# Patient Record
Sex: FEMALE | Race: WHITE | NOT HISPANIC OR LATINO | Employment: OTHER | URBAN - NONMETROPOLITAN AREA
[De-identification: names, ages, dates, MRNs, and addresses within clinical notes are randomized per-mention and may not be internally consistent; named-entity substitution may affect disease eponyms.]

---

## 2024-01-04 DIAGNOSIS — E78.5 HYPERLIPIDEMIA, UNSPECIFIED HYPERLIPIDEMIA TYPE: Primary | ICD-10-CM

## 2024-01-04 PROBLEM — C50.919 BREAST CANCER (MULTI): Status: ACTIVE | Noted: 2024-01-04

## 2024-01-04 PROBLEM — E66.3 OVERWEIGHT: Status: ACTIVE | Noted: 2024-01-04

## 2024-01-04 PROBLEM — E06.3 HASHIMOTO'S THYROIDITIS: Status: ACTIVE | Noted: 2024-01-04

## 2024-01-04 PROBLEM — E11.9 DIABETES MELLITUS (MULTI): Status: ACTIVE | Noted: 2024-01-04

## 2024-01-04 PROBLEM — E03.9 HYPOTHYROIDISM: Status: ACTIVE | Noted: 2024-01-04

## 2024-01-04 PROBLEM — G25.0 ESSENTIAL TREMOR: Status: ACTIVE | Noted: 2024-01-04

## 2024-01-04 PROBLEM — I10 BENIGN ESSENTIAL HYPERTENSION: Status: ACTIVE | Noted: 2024-01-04

## 2024-01-04 RX ORDER — ATENOLOL 50 MG/1
1 TABLET ORAL DAILY
COMMUNITY
End: 2024-04-12 | Stop reason: ALTCHOICE

## 2024-01-04 RX ORDER — PROPRANOLOL HYDROCHLORIDE 80 MG/1
1 TABLET ORAL DAILY
COMMUNITY
End: 2024-04-12 | Stop reason: SDUPTHER

## 2024-01-04 RX ORDER — EZETIMIBE 10 MG/1
1 TABLET ORAL NIGHTLY
COMMUNITY
End: 2024-01-04 | Stop reason: SDUPTHER

## 2024-01-04 RX ORDER — ESCITALOPRAM OXALATE 20 MG/1
1.5 TABLET ORAL DAILY
COMMUNITY

## 2024-01-04 RX ORDER — CYANOCOBALAMIN 1000 UG/ML
1000 INJECTION, SOLUTION INTRAMUSCULAR; SUBCUTANEOUS
COMMUNITY

## 2024-01-04 RX ORDER — INSULIN ASPART 100 [IU]/ML
INJECTION, SUSPENSION SUBCUTANEOUS
COMMUNITY

## 2024-01-04 RX ORDER — CEPHALEXIN 500 MG/1
1 CAPSULE ORAL DAILY
COMMUNITY

## 2024-01-04 RX ORDER — FAMOTIDINE 40 MG/1
1 TABLET, FILM COATED ORAL 2 TIMES DAILY
COMMUNITY

## 2024-01-04 RX ORDER — EZETIMIBE 10 MG/1
10 TABLET ORAL NIGHTLY
Qty: 90 TABLET | Refills: 3 | Status: SHIPPED | OUTPATIENT
Start: 2024-01-04 | End: 2024-04-12 | Stop reason: SDUPTHER

## 2024-01-04 RX ORDER — CARBIDOPA AND LEVODOPA 25; 100 MG/1; MG/1
1 TABLET ORAL DAILY
COMMUNITY
End: 2024-04-12 | Stop reason: SDUPTHER

## 2024-01-04 RX ORDER — THYROID 90 MG/1
1 TABLET ORAL DAILY
COMMUNITY

## 2024-01-04 RX ORDER — CHOLECALCIFEROL (VITAMIN D3) 125 MCG
1 CAPSULE ORAL DAILY
COMMUNITY

## 2024-01-04 RX ORDER — CETIRIZINE HYDROCHLORIDE 10 MG/1
1 TABLET ORAL DAILY
COMMUNITY

## 2024-03-12 ENCOUNTER — APPOINTMENT (OUTPATIENT)
Dept: CARDIOLOGY | Facility: CLINIC | Age: 79
End: 2024-03-12
Payer: MEDICARE

## 2024-04-12 ENCOUNTER — OFFICE VISIT (OUTPATIENT)
Dept: CARDIOLOGY | Facility: CLINIC | Age: 79
End: 2024-04-12
Payer: MEDICARE

## 2024-04-12 VITALS
SYSTOLIC BLOOD PRESSURE: 124 MMHG | WEIGHT: 149 LBS | HEIGHT: 62 IN | HEART RATE: 68 BPM | DIASTOLIC BLOOD PRESSURE: 74 MMHG | BODY MASS INDEX: 27.42 KG/M2

## 2024-04-12 DIAGNOSIS — I10 BENIGN ESSENTIAL HYPERTENSION: ICD-10-CM

## 2024-04-12 DIAGNOSIS — Z78.9 NEVER SMOKED TOBACCO: ICD-10-CM

## 2024-04-12 DIAGNOSIS — E78.5 HYPERLIPIDEMIA, UNSPECIFIED HYPERLIPIDEMIA TYPE: ICD-10-CM

## 2024-04-12 DIAGNOSIS — E78.2 MIXED HYPERLIPIDEMIA: Primary | ICD-10-CM

## 2024-04-12 PROCEDURE — 3078F DIAST BP <80 MM HG: CPT | Performed by: INTERNAL MEDICINE

## 2024-04-12 PROCEDURE — 3074F SYST BP LT 130 MM HG: CPT | Performed by: INTERNAL MEDICINE

## 2024-04-12 PROCEDURE — 99213 OFFICE O/P EST LOW 20 MIN: CPT | Performed by: INTERNAL MEDICINE

## 2024-04-12 PROCEDURE — 1159F MED LIST DOCD IN RCRD: CPT | Performed by: INTERNAL MEDICINE

## 2024-04-12 RX ORDER — PROPRANOLOL HYDROCHLORIDE 80 MG/1
80 TABLET ORAL DAILY
Qty: 90 TABLET | Refills: 3 | Status: SHIPPED | OUTPATIENT
Start: 2024-04-12 | End: 2025-04-12

## 2024-04-12 RX ORDER — CARBIDOPA AND LEVODOPA 25; 100 MG/1; MG/1
1 TABLET ORAL DAILY
Qty: 90 TABLET | Refills: 3 | Status: SHIPPED | OUTPATIENT
Start: 2024-04-12 | End: 2025-04-12

## 2024-04-12 RX ORDER — EZETIMIBE 10 MG/1
10 TABLET ORAL NIGHTLY
Qty: 90 TABLET | Refills: 3 | Status: SHIPPED | OUTPATIENT
Start: 2024-04-12 | End: 2025-04-12

## 2024-04-12 NOTE — PATIENT INSTRUCTIONS
Please bring all medicines, vitamins, and herbal supplements with you when you come to the office.    Prescriptions will not be filled unless you are compliant with your follow up appointments or have a follow up appointment scheduled as per instruction of your physician. Refills should be requested at the time of your visit. Please bring all medicines, vitamins, and herbal supplements with you when you come to the office.    Prescriptions will not be filled unless you are compliant with your follow up appointments or have a follow up appointment scheduled as per instruction of your physician. Refills should be requested at the time of your visit.

## 2024-04-12 NOTE — PROGRESS NOTES
"Subjective   Sofy Mason is a 78 y.o. female       Chief Complaint    Annual Exam          HPI     Review of Systems   All other systems reviewed and are negative.     Patient returns in follow-up of problems as noted.  She is done well.  She denies any angina CHF or arrhythmia symptomatology.  Risk factors are well-controlled including lipids and blood pressure and because of all the above we feel continue therapy to be adequate and appropriate.    Increased BMI is noted in the merits of diet and weight loss since favorable impact on both her blood pressure and diabetes were emphasized.  She understands our recommendation.      Vitals:    04/12/24 1123   BP: 124/74   BP Location: Left arm   Patient Position: Sitting   Pulse: 68   Weight: 67.6 kg (149 lb)   Height: 1.575 m (5' 2\")        Objective   Physical Exam  Constitutional:       Appearance: Normal appearance.   HENT:      Nose: Nose normal.   Neck:      Vascular: No carotid bruit.   Cardiovascular:      Rate and Rhythm: Normal rate.      Pulses: Normal pulses.      Heart sounds: Normal heart sounds.   Pulmonary:      Effort: Pulmonary effort is normal.   Abdominal:      General: Bowel sounds are normal.      Palpations: Abdomen is soft.   Musculoskeletal:         General: Normal range of motion.      Cervical back: Normal range of motion.      Right lower leg: No edema.      Left lower leg: No edema.   Skin:     General: Skin is warm and dry.   Neurological:      General: No focal deficit present.      Mental Status: She is alert.   Psychiatric:         Mood and Affect: Mood normal.         Behavior: Behavior normal.         Thought Content: Thought content normal.         Judgment: Judgment normal.         Allergies  Atorvastatin, Erythromycin, Penicillins, Simvastatin, Statins-hmg-coa reductase inhibitors, and Sulfa (sulfonamide antibiotics)     Current Medications    Current Outpatient Medications:     carbidopa-levodopa (Sinemet)  mg tablet, " Take 1 tablet by mouth once daily., Disp: , Rfl:     cephalexin (Keflex) 500 mg capsule, Take 1 capsule (500 mg) by mouth once daily., Disp: , Rfl:     cetirizine (ZyrTEC) 10 mg tablet, Take 1 tablet (10 mg) by mouth once daily., Disp: , Rfl:     cholecalciferol (Vitamin D-3) 125 MCG (5000 UT) capsule, Take 1 capsule (125 mcg) by mouth once daily., Disp: , Rfl:     cyanocobalamin (Vitamin B-12) 1,000 mcg/mL injection, Inject 1 mL (1,000 mcg) into the muscle every 30 (thirty) days., Disp: , Rfl:     escitalopram (Lexapro) 20 mg tablet, Take 1.5 tablets (30 mg) by mouth once daily., Disp: , Rfl:     ezetimibe (Zetia) 10 mg tablet, Take 1 tablet (10 mg) by mouth once daily at bedtime., Disp: 90 tablet, Rfl: 3    famotidine (Pepcid) 40 mg tablet, Take 1 tablet (40 mg) by mouth 2 times a day., Disp: , Rfl:     insulin asp prt-insulin aspart (NovoLOG Mix 70-30FlexPen U-100) 100 unit/mL (70-30) injection, Inject under the skin., Disp: , Rfl:     propranolol (Inderal) 80 mg tablet, Take 1 tablet (80 mg) by mouth once daily., Disp: , Rfl:     thyroid, pork, ([place] Thyroid) 90 mg tablet, Take 1 tablet (90 mg) by mouth once daily., Disp: , Rfl:                      Assessment/Plan   1. Mixed hyperlipidemia  Review of treatment strategy demonstrates adequate control    2. Benign essential hypertension  Review of treatment strategy demonstrates adequate control    3. Hyperlipidemia, unspecified hyperlipidemia type  As above    4. BMI 27.0-27.9,adult  The merits of diet and weight loss were advocated    5. Never smoked tobacco  Noted           Scribe Attestation  By signing my name below, I, Bobby Hein LPN   attest that this documentation has been prepared under the direction and in the presence of Eliceo Heath MD.     Provider Attestation - Scribe documentation    All medical record entries made by the Scribe were at my direction and personally dictated by me. I have reviewed the chart and agree that the record  accurately reflects my personal performance of the history, physical exam, discussion and plan.

## 2024-04-12 NOTE — LETTER
"April 14, 2024     Júnior Piper DO  [address]  [address]  [facility] [address]    Patient: Sofy Mason   YOB: 1945   Date of Visit: 4/12/2024       Dear Dr. Júnior Piper, :    Thank you for referring Sofy Mason to me for evaluation. Below are my notes for this consultation.  If you have questions, please do not hesitate to call me. I look forward to following your patient along with you.       Sincerely,     Eliceo Heath MD      CC: No Recipients  ______________________________________________________________________________________    Subjective   Sofy Mason is a 78 y.o. female       Chief Complaint    Annual Exam          HPI     Review of Systems   All other systems reviewed and are negative.     Patient returns in follow-up of problems as noted.  She is done well.  She denies any angina CHF or arrhythmia symptomatology.  Risk factors are well-controlled including lipids and blood pressure and because of all the above we feel continue therapy to be adequate and appropriate.    Increased BMI is noted in the merits of diet and weight loss since favorable impact on both her blood pressure and diabetes were emphasized.  She understands our recommendation.      Vitals:    04/12/24 1123   BP: 124/74   BP Location: Left arm   Patient Position: Sitting   Pulse: 68   Weight: 67.6 kg (149 lb)   Height: 1.575 m (5' 2\")        Objective   Physical Exam  Constitutional:       Appearance: Normal appearance.   HENT:      Nose: Nose normal.   Neck:      Vascular: No carotid bruit.   Cardiovascular:      Rate and Rhythm: Normal rate.      Pulses: Normal pulses.      Heart sounds: Normal heart sounds.   Pulmonary:      Effort: Pulmonary effort is normal.   Abdominal:      General: Bowel sounds are normal.      Palpations: Abdomen is soft.   Musculoskeletal:         General: Normal range of motion.      Cervical back: Normal range of motion.      Right lower leg: No edema.     "  Left lower leg: No edema.   Skin:     General: Skin is warm and dry.   Neurological:      General: No focal deficit present.      Mental Status: She is alert.   Psychiatric:         Mood and Affect: Mood normal.         Behavior: Behavior normal.         Thought Content: Thought content normal.         Judgment: Judgment normal.         Allergies  Atorvastatin, Erythromycin, Penicillins, Simvastatin, Statins-hmg-coa reductase inhibitors, and Sulfa (sulfonamide antibiotics)     Current Medications    Current Outpatient Medications:   •  carbidopa-levodopa (Sinemet)  mg tablet, Take 1 tablet by mouth once daily., Disp: , Rfl:   •  cephalexin (Keflex) 500 mg capsule, Take 1 capsule (500 mg) by mouth once daily., Disp: , Rfl:   •  cetirizine (ZyrTEC) 10 mg tablet, Take 1 tablet (10 mg) by mouth once daily., Disp: , Rfl:   •  cholecalciferol (Vitamin D-3) 125 MCG (5000 UT) capsule, Take 1 capsule (125 mcg) by mouth once daily., Disp: , Rfl:   •  cyanocobalamin (Vitamin B-12) 1,000 mcg/mL injection, Inject 1 mL (1,000 mcg) into the muscle every 30 (thirty) days., Disp: , Rfl:   •  escitalopram (Lexapro) 20 mg tablet, Take 1.5 tablets (30 mg) by mouth once daily., Disp: , Rfl:   •  ezetimibe (Zetia) 10 mg tablet, Take 1 tablet (10 mg) by mouth once daily at bedtime., Disp: 90 tablet, Rfl: 3  •  famotidine (Pepcid) 40 mg tablet, Take 1 tablet (40 mg) by mouth 2 times a day., Disp: , Rfl:   •  insulin asp prt-insulin aspart (NovoLOG Mix 70-30FlexPen U-100) 100 unit/mL (70-30) injection, Inject under the skin., Disp: , Rfl:   •  propranolol (Inderal) 80 mg tablet, Take 1 tablet (80 mg) by mouth once daily., Disp: , Rfl:   •  thyroid, pork, ([place] Thyroid) 90 mg tablet, Take 1 tablet (90 mg) by mouth once daily., Disp: , Rfl:                      Assessment/Plan   1. Mixed hyperlipidemia  Review of treatment strategy demonstrates adequate control    2. Benign essential hypertension  Review of treatment strategy  demonstrates adequate control    3. Hyperlipidemia, unspecified hyperlipidemia type  As above    4. BMI 27.0-27.9,adult  The merits of diet and weight loss were advocated    5. Never smoked tobacco  Noted           Scribe Attestation  By signing my name below, I, Bobby Hein LPN   attest that this documentation has been prepared under the direction and in the presence of Eliceo Heath MD.     Provider Attestation - Scribe documentation    All medical record entries made by the Scribe were at my direction and personally dictated by me. I have reviewed the chart and agree that the record accurately reflects my personal performance of the history, physical exam, discussion and plan.

## 2025-04-18 ENCOUNTER — APPOINTMENT (OUTPATIENT)
Dept: CARDIOLOGY | Facility: CLINIC | Age: 80
End: 2025-04-18
Payer: MEDICARE